# Patient Record
Sex: FEMALE | Race: WHITE | NOT HISPANIC OR LATINO | Employment: FULL TIME | URBAN - NONMETROPOLITAN AREA
[De-identification: names, ages, dates, MRNs, and addresses within clinical notes are randomized per-mention and may not be internally consistent; named-entity substitution may affect disease eponyms.]

---

## 2020-06-23 ENCOUNTER — LAB (OUTPATIENT)
Dept: LAB | Facility: HOSPITAL | Age: 60
End: 2020-06-23

## 2020-06-23 ENCOUNTER — TRANSCRIBE ORDERS (OUTPATIENT)
Dept: LAB | Facility: HOSPITAL | Age: 60
End: 2020-06-23

## 2020-06-23 DIAGNOSIS — Z20.828 EXPOSURE TO SARS-ASSOCIATED CORONAVIRUS: Primary | ICD-10-CM

## 2020-06-23 DIAGNOSIS — Z20.828 EXPOSURE TO SARS-ASSOCIATED CORONAVIRUS: ICD-10-CM

## 2020-06-23 PROCEDURE — U0003 INFECTIOUS AGENT DETECTION BY NUCLEIC ACID (DNA OR RNA); SEVERE ACUTE RESPIRATORY SYNDROME CORONAVIRUS 2 (SARS-COV-2) (CORONAVIRUS DISEASE [COVID-19]), AMPLIFIED PROBE TECHNIQUE, MAKING USE OF HIGH THROUGHPUT TECHNOLOGIES AS DESCRIBED BY CMS-2020-01-R: HCPCS

## 2020-06-25 LAB — SARS-COV-2 RNA RESP QL NAA+PROBE: NOT DETECTED

## 2020-09-17 ENCOUNTER — APPOINTMENT (OUTPATIENT)
Dept: MAMMOGRAPHY | Facility: HOSPITAL | Age: 60
End: 2020-09-17

## 2021-01-26 ENCOUNTER — HOSPITAL ENCOUNTER (OUTPATIENT)
Dept: GENERAL RADIOLOGY | Facility: HOSPITAL | Age: 61
Discharge: HOME | End: 2021-01-26
Admitting: NURSE PRACTITIONER

## 2021-01-26 ENCOUNTER — TRANSCRIBE ORDERS (OUTPATIENT)
Dept: ADMINISTRATIVE | Facility: HOSPITAL | Age: 61
End: 2021-01-26

## 2021-01-26 DIAGNOSIS — R10.9 ABDOMINAL PAIN, UNSPECIFIED ABDOMINAL LOCATION: Primary | ICD-10-CM

## 2021-01-26 DIAGNOSIS — R10.9 ABDOMINAL PAIN, UNSPECIFIED ABDOMINAL LOCATION: ICD-10-CM

## 2021-01-26 PROCEDURE — 74018 RADEX ABDOMEN 1 VIEW: CPT

## 2021-01-26 PROCEDURE — 74018 RADEX ABDOMEN 1 VIEW: CPT | Performed by: RADIOLOGY

## 2021-03-26 ENCOUNTER — TRANSCRIBE ORDERS (OUTPATIENT)
Dept: ADMINISTRATIVE | Facility: HOSPITAL | Age: 61
End: 2021-03-26

## 2021-03-26 DIAGNOSIS — R10.9 ABDOMINAL PAIN, UNSPECIFIED ABDOMINAL LOCATION: Primary | ICD-10-CM

## 2021-08-10 ENCOUNTER — TRANSCRIBE ORDERS (OUTPATIENT)
Dept: PHYSICAL THERAPY | Facility: CLINIC | Age: 61
End: 2021-08-10

## 2021-08-10 DIAGNOSIS — S39.012A LUMBAR STRAIN, INITIAL ENCOUNTER: Primary | ICD-10-CM

## 2021-08-11 ENCOUNTER — TREATMENT (OUTPATIENT)
Dept: PHYSICAL THERAPY | Facility: CLINIC | Age: 61
End: 2021-08-11

## 2021-08-11 DIAGNOSIS — S39.012D STRAIN OF LUMBAR REGION, SUBSEQUENT ENCOUNTER: Primary | ICD-10-CM

## 2021-08-11 PROCEDURE — 97162 PT EVAL MOD COMPLEX 30 MIN: CPT | Performed by: PHYSICAL THERAPIST

## 2021-08-11 NOTE — PROGRESS NOTES
"  Physical Therapy Initial Evaluation and Plan of Care    Patient: Elizabeth Olivares   : 1960  Diagnosis/ICD-10 Code:  Strain of lumbar region, subsequent encounter [S39.012D]  Referring practitioner: JAYNE Pacheco  Date of Initial Visit: 2021  Today's Date: 2021  Patient seen for 1 sessions    Visit Diagnoses:    ICD-10-CM ICD-9-CM   1. Strain of lumbar region, subsequent encounter  S39.012D V58.89     847.2           Subjective Evaluation    History of Present Illness  Date of onset: 8/3/2021  Mechanism of injury: The patient reports injuring her low back while assisting a patient. She states the patient slipped from the couch to the floor. When the patient tried to assist the elderly patient to the couch, using a gait belt, she experienced a sharp pull in her low back. The patient states she was unable to transfer her patient to the couch and called the patient's relative for assistance. She states once the adrenaline wore off she began experiencing increased pain. The patient reports the pain is \"like a pulsating throb\". The patient reports she was seen at [facility] on 21. She states she was instructed to not perform repetitive twist/turn or lifting greater than 10 pounds. The patient is currently working on light duty, cooking for patients. She followed up with [facility] on 8/10/21 and instructed to begin physical therapy. She states she has not experienced shooting pain down her leg. The patient reports being independent prior to today's session.       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   Precautions and Work Restrictions: No repetitive twisting, turning, or lifting greater than 10#.Quality of life: good    Pain  Current pain rating: 3  At best pain rating: 3  At worst pain ratin  Quality: throbbing and tight  Relieving factors: medications and rest  Aggravating factors: lifting, movement, ambulation, prolonged " positioning, sleeping, squatting and stairs    Social Support  Lives in: one-story house (Three exterior steps without handrails)  Lives with: alone    Hand dominance: right    Diagnostic Tests  No diagnostic tests performed    Treatments  Previous treatment: medication  Patient Goals  Patient goals for therapy: decreased pain, improved balance, increased motion, increased strength, independence with ADLs/IADLs and return to sport/leisure activities  Patient goal: Return to full duty at work           Objective          Palpation   Left   No palpable tenderness to the external abdominal oblique, iliacus, iliopsoas, internal abdominal oblique, quadratus lumborum, rectus abdominus and transverse abdominus.   Muscle spasm in the erector spinae.   Tenderness of the erector spinae, gluteus medius, lumbar interspinals and lumbar paraspinals.     Right   No palpable tenderness to the external abdominal oblique, iliacus, iliopsoas, internal abdominal oblique, quadratus lumborum, rectus abdominus and transverse abdominus.   Muscle spasm in the erector spinae and lumbar interspinals. Tenderness of the erector spinae, gluteus medius, lumbar interspinals and lumbar paraspinals.     Tenderness     Lumbar Spine  Tenderness in the spinous process, left transverse process and right transverse process.     Additional Tenderness Details  Right transverse processes T12-L2  Right SIJ pain    Neurological Testing     Reflexes   Left   Patellar (L4): normal (2+)  Achilles (S1): normal (2+)    Right   Patellar (L4): normal (2+)  Achilles (S1): normal (2+)    Additional Neurological Details  Diminished L3 sensation on the right.    Active Range of Motion     Lumbar   Flexion: Active lumbar flexion: 50%   Extension: Active lumbar extension: 25% impaired.   Left lateral flexion: Active left lumbar lateral flexion: 25% impaired.   Right lateral flexion: Active right lumbar lateral flexion: 25% impaired.   Left rotation: Active left lumbar  rotation: 50%   Right rotation: Active right lumbar rotation: 50%     Strength/Myotome Testing     Left Hip   Planes of Motion   Flexion: 4-  Abduction: 4  Adduction: 4    Right Hip   Planes of Motion   Flexion: 4-  Abduction: 4  Adduction: 4    Left Knee   Flexion: 4-  Extension: 4-    Right Knee   Flexion: 4-  Extension: 4-    Tests       Thoracic   Negative slump.     Lumbar     Left   Negative passive SLR.     Right   Negative passive SLR.     Left Pelvic Girdle/Sacrum   Positive: sacrum compression, sacral spring and thigh thrust.   Negative: gapping.     Right Pelvic Girdle/Sacrum   Positive: sacrum compression, sacral spring and thigh thrust.   Negative: gapping.     Left Hip   Positive AMBERLY, FADIR, scour and SI compression.   Negative SI distraction.     Right Hip   Positive AMBERLY, FADIR, scour and SI compression.   Negative SI distraction.     Additional Tests Details  Right anteriorly rotated innominate addressed with METs.          Assessment & Plan     Assessment  Impairments: abnormal gait, abnormal or restricted ROM, activity intolerance, impaired physical strength, lacks appropriate home exercise program, pain with function and safety issue  Assessment details: The patient is a 60 year old female presenting to the clinic with lumbar pain following an injury at work. The patient demonstrated impaired lumbar AROM in all planes, with pain present. In addition, bilateral lower extremity weakness was present. The patient reported the absence of bilateral lower extremity radicular pain which was not produced with special tests. Upon assessment, the patient demonstrated an anteriorly rotated right innominate, consistent with subjective reports and special tests, which was addressed with METs. Muscle guarding was present in bilateral lumbar paraspinals, as well as tenderness and hypomobility of T10-L2. The patient would benefit from skilled physical therapy to address functional limitations and impairments.  She will be progressed as tolerated for maximal gains.    Prognosis: good  Functional Limitations: lifting, sleeping, walking, pulling, pushing, uncomfortable because of pain, sitting, standing and unable to perform repetitive tasks  Goals  Plan Goals: SHORT TERM GOALS:     2 weeks  1. Pt will be instructed in HEP for improved independence.  2. Pt to demonstrate a 25% lumbar AROM for improved ability to perform ADL's.  3. Pt to demonstrate bilateral hip strength 4/5 in all planes to improved stability of the core/trunk.     LONG TERM GOALS:   6 weeks  1. Pt to demonstrate lumbar AROM within 75% of expected norms to allow for improved ability to perform functional activities.  2. Pt to demonstrate ability to independently perform supine<>sit transfers without extra time required for improved functional mobility.  3. Pt to report less than 30% impairment on the Modified Oswestry for improved functional independence.  4. Pt will demonstrate the ability to perform 10 functional squats with no increase in pain for improved ability to perform work tasks.  5. Pt will report the ability to stand for at least 45 minutes with no increase in lumbar pain for improved ability to perform work tasks.        Plan  Therapy options: will be seen for skilled physical therapy services  Planned modality interventions: cryotherapy, thermotherapy (hydrocollator packs), electrical stimulation/Russian stimulation, traction, ultrasound and dry needling  Planned therapy interventions: manual therapy, home exercise program, flexibility, body mechanics training, strengthening, spinal/joint mobilization, postural training, abdominal trunk stabilization, balance/weight-bearing training, functional ROM exercises, gait training, joint mobilization, motor coordination training, neuromuscular re-education, soft tissue mobilization, stretching, transfer training and therapeutic activities  Duration in visits: 18  Duration in weeks: 6  Treatment plan  discussed with: patient  Plan details: Pt will be re-assessed upon follow up for tolerance to pain relieving exercise program.     Moderate Evaluation  98287  Re-evaluation   91574    Therapeutic exercise  77698  Therapeutic activity    71895  Neuromuscular re-education   07974  Manual therapy   65561  Gait training  37119    Attended e-stim  89119  Unattended e-stim (Private)  42446  Moist heat/cryotherapy 71849   Ultrasound   95686  Iontophoresis   43494  Mechanical traction 51690          Manual Therapy:         mins  62222;  Therapeutic Exercise:         mins  38410;     Neuromuscular Ken:        mins  67493;    Therapeutic Activity:          mins  15479;     Gait Training:           mins  17549;     Ultrasound:          mins  79329;    Electrical Stimulation:         mins  60572 ( );  Dry Needling          mins self-pay    Timed Treatment:  0   mins   Total Treatment:     34   mins    PT SIGNATURE: Ashley Claudene Dalton, PT   DATE TREATMENT INITIATED: 8/11/2021    Initial Certification  Certification Period: 11/9/2021  I certify that the therapy services are furnished while this patient is under my care.  The services outlined above are required by this patient, and will be reviewed every 90 days.     PHYSICIAN: Vonnie Castaneda, APRN      DATE:     Please sign and return via fax to [phone].. Thank you, [facility].

## 2021-08-16 ENCOUNTER — TREATMENT (OUTPATIENT)
Dept: PHYSICAL THERAPY | Facility: CLINIC | Age: 61
End: 2021-08-16

## 2021-08-16 DIAGNOSIS — S39.012D STRAIN OF LUMBAR REGION, SUBSEQUENT ENCOUNTER: Primary | ICD-10-CM

## 2021-08-16 PROCEDURE — 97110 THERAPEUTIC EXERCISES: CPT | Performed by: PHYSICAL THERAPIST

## 2021-08-16 PROCEDURE — 97014 ELECTRIC STIMULATION THERAPY: CPT | Performed by: PHYSICAL THERAPIST

## 2021-08-16 NOTE — PROGRESS NOTES
"   Physical Therapy Daily Treatment Note      Patient: Elziabeth Olivares   : 1960  Referring practitioner: JAYNE Pacheco  Date of Initial Visit: Type: THERAPY  Noted: 2021  Today's Date: 2021  Patient seen for 2 sessions           Subjective Evaluation    History of Present Illness    Subjective comment: Pt reports 2/10 low back pain prior to today's session. She states, \"I think the alignment helped a lot, I had no pain yesterday\".Pain  Current pain ratin           Objective   See Exercise, Manual, and Modality Logs for complete treatment.       Assessment & Plan     Assessment  Assessment details: Today's session was initiated with moist heat combined with IFC to the lumbar region for pain relief and muscle relaxtion.Therapeutic exercises were then performed to address impaired posture, lumbar AROM, and bilateral lower extremity weakness. Rest breaks were provided as needed. Upon conclusion of today's session, the SIJ was assessed, with no abnormal rotation present. The patient reported 2/10 lumbar pain upon conclusion of today's treatment session.    Plan  Plan details: Introduce manual therapy to address muscle guarding.        Visit Diagnoses:    ICD-10-CM ICD-9-CM   1. Strain of lumbar region, subsequent encounter  S39.012D V58.89     847.2       Progress per Plan of Care           Timed:  Manual Therapy:         mins  29389;  Therapeutic Exercise:   31   mins  54217;     Neuromuscular Ken:        mins  83175;    Therapeutic Activity:          mins  91806;     Gait Training:           mins  13633;     Ultrasound:          mins  97935;    Electrical Stimulation:         mins  12554 ( );    Untimed:  Electrical Stimulation:   10     mins  67381 ( );  Mechanical Traction:         mins  49959;     Timed Treatment:   31   mins   Total Treatment:     41   mins    Ashley Claudene Dalton, CHUCK  Physical Therapist                  "

## 2021-08-18 ENCOUNTER — TREATMENT (OUTPATIENT)
Dept: PHYSICAL THERAPY | Facility: CLINIC | Age: 61
End: 2021-08-18

## 2021-08-18 DIAGNOSIS — S39.012D STRAIN OF LUMBAR REGION, SUBSEQUENT ENCOUNTER: Primary | ICD-10-CM

## 2021-08-18 PROCEDURE — 97014 ELECTRIC STIMULATION THERAPY: CPT | Performed by: PHYSICAL THERAPIST

## 2021-08-18 PROCEDURE — 97110 THERAPEUTIC EXERCISES: CPT | Performed by: PHYSICAL THERAPIST

## 2021-08-18 NOTE — PROGRESS NOTES
"   Physical Therapy Daily Treatment Note      Patient: Elizabeth Olivares   : 1960  Referring practitioner: JAYNE Pacheco  Date of Initial Visit: Type: THERAPY  Noted: 2021  Today's Date: 2021  Patient seen for 3 sessions           Subjective Evaluation    History of Present Illness    Subjective comment: The patient reports 1/10 low back pain prior to today's session. She states she feels as though she's \"getting better\".Pain  Current pain ratin           Objective   See Exercise, Manual, and Modality Logs for complete treatment.       Assessment & Plan     Assessment  Assessment details: Today's treatment was initiated with moist heat combined with IFC to the lumbar region for pain relief and muscle relaxation, with no skin irritation observed. Therapeutic exercises were progressed to include standing activities for improved bilateral hip strength and standing balance. In addition, thera-band activities were improved for BLE strength. The patient reported fatigue but no increase in pain with therapeutic exercises. Following today's treatment she reported she was \"stiff, but 0/10 pain\".     Plan  Plan details: Progress standing activities for improved functional independence.        Visit Diagnoses:    ICD-10-CM ICD-9-CM   1. Strain of lumbar region, subsequent encounter  S39.012D V58.89     847.2       Progress per Plan of Care           Timed:  Manual Therapy:         mins  20689;  Therapeutic Exercise:   42   mins  78228;     Neuromuscular Ken:        mins  67127;    Therapeutic Activity:          mins  64081;     Gait Training:           mins  72225;     Ultrasound:          mins  64912;    Electrical Stimulation:         mins  18166 ( );    Untimed:  Electrical Stimulation:    10     mins  88060 ( );  Mechanical Traction:         mins  03058;     Timed Treatment:   42   mins   Total Treatment:     52   mins    Ashley Claudene Dalton, PT  Physical " Therapist

## 2021-08-20 ENCOUNTER — TREATMENT (OUTPATIENT)
Dept: PHYSICAL THERAPY | Facility: CLINIC | Age: 61
End: 2021-08-20

## 2021-08-20 DIAGNOSIS — S39.012D STRAIN OF LUMBAR REGION, SUBSEQUENT ENCOUNTER: Primary | ICD-10-CM

## 2021-08-20 PROCEDURE — 97110 THERAPEUTIC EXERCISES: CPT | Performed by: PHYSICAL THERAPIST

## 2021-08-20 PROCEDURE — 97014 ELECTRIC STIMULATION THERAPY: CPT | Performed by: PHYSICAL THERAPIST

## 2021-08-20 NOTE — PROGRESS NOTES
"   Physical Therapy Daily Treatment Note      Patient: Elizabeth Olivares   : 1960  Referring practitioner: JAYNE Pacheco  Date of Initial Visit: Type: THERAPY  Noted: 2021  Today's Date: 2021  Patient seen for 4 sessions           Subjective Evaluation    History of Present Illness    Subjective comment: Pt reports 1/10 low back \"stiffness\" prior to today's session. She states she needs \"to be able to lift\".Pain  Current pain ratin           Objective   See Exercise, Manual, and Modality Logs for complete treatment.       Assessment & Plan     Assessment  Assessment details: Today's session included increased repetitions of therapeutic exercises for improved strength and endurance. An increased amount of rest breaks were provided secondary to fatigue with progression. SKTC and piriformis stretches were also introduced into today's session, addressing bilateral hip and lumbar tightness. The patient was instructed to perform these two stretching exercises at home as well. Today's treatment concluded with MH combined with IFC to the lumbar region for pain relief and muscle relaxation. No skin irritation was observed following modalities. The patient reported 0/10 pain and stiffness following today's treatment.    Plan  Plan details: Progress standing activities as tolerated, introducing lifting as able, for improved ability to perform work tasks.        Visit Diagnoses:    ICD-10-CM ICD-9-CM   1. Strain of lumbar region, subsequent encounter  S39.012D V58.89     847.2       Progress per Plan of Care           Timed:  Manual Therapy:         mins  31240;  Therapeutic Exercise:    44     mins  64640;     Neuromuscular Ken:        mins  58469;    Therapeutic Activity:          mins  71681;     Gait Training:           mins  74799;     Ultrasound:          mins  19508;    Electrical Stimulation:         mins  37890 ( );    Untimed:  Electrical Stimulation:    10     mins  11864 ( " );  Mechanical Traction:         mins  72449;     Timed Treatment:   44   mins   Total Treatment:     54   mins    Ashley Claudene Dalton, PT  Physical Therapist

## 2021-09-13 ENCOUNTER — TREATMENT (OUTPATIENT)
Dept: PHYSICAL THERAPY | Facility: CLINIC | Age: 61
End: 2021-09-13

## 2021-09-13 DIAGNOSIS — S39.012D STRAIN OF LUMBAR REGION, SUBSEQUENT ENCOUNTER: Primary | ICD-10-CM

## 2021-09-13 PROCEDURE — 97110 THERAPEUTIC EXERCISES: CPT | Performed by: PHYSICAL THERAPIST

## 2021-09-13 PROCEDURE — 97014 ELECTRIC STIMULATION THERAPY: CPT | Performed by: PHYSICAL THERAPIST

## 2021-09-13 NOTE — PROGRESS NOTES
Physical Therapy Daily Treatment Note      Patient: Elizabeth Olivares   : 1960  Referring practitioner: JAYNE Pacheco  Date of Initial Visit: Type: THERAPY  Noted: 2021  Today's Date: 2021  Patient seen for 5 sessions           Subjective Evaluation    History of Present Illness    Subjective comment: Pt reports 0/10 low back prior to today's session. She states she has been unable to attend secondary to sickness, but has been performing her HEP as able.Pain  Current pain ratin           Objective   See Exercise, Manual, and Modality Logs for complete treatment.       Assessment & Plan     Assessment  Assessment details: Moist heat combined with IFC was applied to the patient's lumbar region to begin today's session, addressing pain and muscle tightness, with no skin irritation observed. Today's session included therapeutic exercises in the seated, supine, and standing positions to address lumbar ROM and BLE strength. A minimal amount of supine activities were performed secondary to patient having residual effects of recent Covid-19. Rest breaks were provided as needed and oxygen saturation was monitored throughout the session due to recently having Covid-19. The patient's oxygen saturation remained 98%-99% throughout the session. The patient reported 1/10 lumbar pain following today's treatment.      Plan  Plan details: Progress repetitions as able due to ongoing breathing issues from Covid-19.        Visit Diagnoses:    ICD-10-CM ICD-9-CM   1. Strain of lumbar region, subsequent encounter  S39.012D V58.89     847.2       Progress per Plan of Care           Timed:  Manual Therapy:         mins  71503;  Therapeutic Exercise:  26   mins  22971;     Neuromuscular Ken:        mins  22256;    Therapeutic Activity:          mins  89656;     Gait Training:           mins  83610;     Ultrasound:          mins  39246;    Electrical Stimulation:         mins  25272 (  );    Untimed:  Electrical Stimulation:   15    mins  33876 ( );  Mechanical Traction:         mins  04866;     Timed Treatment:   26  mins   Total Treatment:    41  mins    Ashley Claudene Dalton, PT  Physical Therapist

## 2021-09-15 ENCOUNTER — TREATMENT (OUTPATIENT)
Dept: PHYSICAL THERAPY | Facility: CLINIC | Age: 61
End: 2021-09-15

## 2021-09-15 DIAGNOSIS — S39.012D STRAIN OF LUMBAR REGION, SUBSEQUENT ENCOUNTER: Primary | ICD-10-CM

## 2021-09-15 PROCEDURE — 97110 THERAPEUTIC EXERCISES: CPT | Performed by: PHYSICAL THERAPIST

## 2021-09-15 PROCEDURE — 97014 ELECTRIC STIMULATION THERAPY: CPT | Performed by: PHYSICAL THERAPIST

## 2021-09-15 NOTE — PROGRESS NOTES
"Progress Note      Patient: Elizabeth Olivares   : 1960  Diagnosis/ICD-10 Code:  Strain of lumbar region, subsequent encounter [S39.012D]  Referring practitioner: JAYNE Pacheco  Date of Initial Visit: Type: THERAPY  Noted: 2021  Today's Date: 9/15/2021  Patient seen for 6 sessions      Subjective:     Subjective Questionnaire: Oswestry: 44% impaired  Clinical Progress: improved  Home Program Compliance: Yes  Treatment has included: therapeutic exercise, manual therapy, therapeutic activity, electrical stimulation, moist heat and cryotherapy    Subjective Evaluation    History of Present Illness    Subjective comment: Pt reports 0/10 low back pain prior to today's session. She reports she \"wants to keep getting stronger\". The patient reports she is scheduled to follow-up with JAYNE Albert on 21.Pain  Current pain ratin         Objective          Active Range of Motion     Lumbar   Extension: Active lumbar extension: 75% available.   Left lateral flexion: Active left lumbar lateral flexion: 50%   Right lateral flexion: Active right lumbar lateral flexion: 50%   Left rotation: Active left lumbar rotation: 75% available.   Right rotation: Active right lumbar rotation: 75% available.     Strength/Myotome Testing     Left Hip   Planes of Motion   Flexion: 4  Abduction: 4    Right Hip   Planes of Motion   Flexion: 4  Abduction: 4    Left Knee   Flexion: 4  Extension: 4    Right Knee   Flexion: 4  Extension: 4      Assessment & Plan     Assessment  Impairments: abnormal gait, abnormal or restricted ROM, activity intolerance, impaired physical strength, lacks appropriate home exercise program, pain with function and safety issue  Assessment details: Outcome measures were performed during today's session with patient achieving 3/3 STG and 1/5 LTG. The patient demonstrated improvements in lumbar AROM and bilateral lower extremity strength. She reported 44% impairment on the Modified " Oswestry, reporting difficulty with prolonged positioning. The patient would benefit from continued skilled physical therapy to further improve functional independence.  Moist heat combined with IFC was applied to the lumbar region for pain relief and muscle relaxation to initiate today's session. No skin irritation was observed following modalities. Today's session included therapeutic exercises to address lumbar ROM and bilateral lower extremity strength. Rest breaks were provided as needed. Supine activities were held secondary to patient having difficulty lying down due to recent Covid-19. The patient reported 0/10 lumbar pain upon conclusion of today's treatment.  Prognosis: good  Functional Limitations: lifting, sleeping, walking, pulling, pushing, uncomfortable because of pain, sitting, standing and unable to perform repetitive tasks  Goals  Plan Goals: SHORT TERM GOALS:     2 weeks  1. Pt will be instructed in HEP for improved independence.        Met  2. Pt to demonstrate a 25% lumbar AROM for improved ability to perform ADL's.        Met  3. Pt to demonstrate bilateral hip strength 4/5 in all planes to improved stability of the core/trunk.         Met: 4/5    LONG TERM GOALS:   6 weeks  1. Pt to demonstrate lumbar AROM within 75% of expected norms to allow for improved ability to perform functional activities.       Partially Met: 50% left lateral flexion  2. Pt to demonstrate ability to independently perform supine<>sit transfers without extra time required for improved functional mobility.       Met  3. Pt to report less than 30% impairment on the Modified Oswestry for improved functional independence.       Progressin% impaired  4. Pt will demonstrate the ability to perform 10 functional squats with no increase in pain for improved ability to perform work tasks.       Ongoing  5. Pt will report the ability to stand for at least 45 minutes with no increase in lumbar pain for improved ability to  perform work tasks.       Ongoing      Plan  Therapy options: will be seen for skilled physical therapy services  Planned modality interventions: cryotherapy, thermotherapy (hydrocollator packs), electrical stimulation/Russian stimulation, traction, ultrasound and dry needling  Planned therapy interventions: manual therapy, home exercise program, flexibility, body mechanics training, strengthening, spinal/joint mobilization, postural training, abdominal trunk stabilization, balance/weight-bearing training, functional ROM exercises, gait training, joint mobilization, motor coordination training, neuromuscular re-education, soft tissue mobilization, stretching, transfer training and therapeutic activities  Duration in visits: 12  Duration in weeks: 4  Treatment plan discussed with: patient  Plan details: Pt will be re-assessed upon follow up for tolerance to pain relieving exercise program.     Moderate Evaluation  14052  Re-evaluation   49957    Therapeutic exercise  80890  Therapeutic activity    33300  Neuromuscular re-education   65128  Manual therapy   33768  Gait training  85864    Attended e-stim  33594  Unattended e-stim (Private)  79703  Moist heat/cryotherapy 72359   Ultrasound   86347  Iontophoresis   03105  Mechanical traction 01616          Visit Diagnoses:    ICD-10-CM ICD-9-CM   1. Strain of lumbar region, subsequent encounter  S39.012D V58.89     847.2       Progress toward previous goals: Partially Met    New Goals  Short-term goals (STG): 3/3  Long-term goals (LTG): 1/5      Recommendations: Continue as planned  Timeframe: 1 month  Prognosis to achieve goals: good    PT Signature: Ashley Claudene Dalton, PT      Based upon review of the patient's progress and continued therapy plan, it is my medical opinion that Elizabeth Olivares should continue physical therapy treatment at [facility]  [address]  [address]  [place]  [address]  [phone].    Signature: __________________________________  Vonnie Castaneda APRN    Timed:  Manual Therapy:         mins  60180;  Therapeutic Exercise:   29   mins  53115;     Neuromuscular Ken:        mins  07290;    Therapeutic Activity:          mins  93555;     Gait Training:           mins  61712;     Ultrasound:          mins  60988;    Electrical Stimulation:         mins  11712 ( );    Untimed:  Electrical Stimulation:   15    mins  12523 ( );  Mechanical Traction:         mins  24579;     Timed Treatment:   29   mins   Total Treatment:     44   mins

## 2021-09-17 ENCOUNTER — TREATMENT (OUTPATIENT)
Dept: PHYSICAL THERAPY | Facility: CLINIC | Age: 61
End: 2021-09-17

## 2021-09-17 DIAGNOSIS — S39.012D STRAIN OF LUMBAR REGION, SUBSEQUENT ENCOUNTER: Primary | ICD-10-CM

## 2021-09-17 PROCEDURE — 97014 ELECTRIC STIMULATION THERAPY: CPT | Performed by: PHYSICAL THERAPIST

## 2021-09-17 PROCEDURE — 97110 THERAPEUTIC EXERCISES: CPT | Performed by: PHYSICAL THERAPIST

## 2021-09-17 NOTE — PROGRESS NOTES
Physical Therapy Daily Treatment Note      Patient: Elizabeth Olivares   : 1960  Referring practitioner: JAYNE Pacheco  Date of Initial Visit: Type: THERAPY  Noted: 2021  Today's Date: 2021  Patient seen for 7 sessions         Subjective:  Patient reports doing well this morning, w/ no complaints of pain prior to tx.  Patient states she is scheduled to f/u with Psychiatric Occup[facility] Medicine on Tuesday, and is hoping to be released back to work.      Objective   See Exercise, Manual, and Modality Logs for complete treatment.       Assessment/Plan:  Patient responded well to today's session w/ no complaints of pain noted following.  Pt provided w/ rest breaks as needed secondary to dyspnea which pt relates to recent bout of COVID.  O2 levels monitored w/ reading of 99%, HR 68.  Treatment initiated w/ therex as listed w/ focus on improved lumbar ROM, improved lumbar stability and postural strength, as tolerated. Exercise progressed w/ mid and low rows w/ tband initiated.  Pt observed w/ good tolerance, and was provided w/ cues and demonstration for good form, and for max benefit w/ activities.   w/ Estim applied at conclusion.  Pt continues to benefit from therapy services and will be progressed as tolerated to address goals, and restore PLOF.  Continue w/ PT's POC.     Visit Diagnoses:    ICD-10-CM ICD-9-CM   1. Strain of lumbar region, subsequent encounter  S39.012D V58.89     847.2       Progress per Plan of Care and Progress strengthening /stabilization /functional activity           Timed:  Manual Therapy:         mins  91079;  Therapeutic Exercise:     42    mins  99878;     Neuromuscular Ken:        mins  55778;    Therapeutic Activity:          mins  58739;     Gait Training:           mins  23582;     Ultrasound:          mins  26841;    Electrical Stimulation:         mins  31669 ( );    Untimed:  Electrical Stimulation:    15     mins  82259 (  );  Mechanical Traction:         mins  25892;     Timed Treatment:  42    mins   Total Treatment:   57     mins  Flakita Mcgee. HORACE Louis  Physical Therapist

## 2021-09-21 ENCOUNTER — DOCUMENTATION (OUTPATIENT)
Dept: PHYSICAL THERAPY | Facility: CLINIC | Age: 61
End: 2021-09-21

## 2021-09-21 DIAGNOSIS — S39.012D STRAIN OF LUMBAR REGION, SUBSEQUENT ENCOUNTER: Primary | ICD-10-CM

## 2021-09-21 NOTE — PROGRESS NOTES
Discharge Summary  Discharge Summary from Physical Therapy Report    Patient Information  Elizabeth Olivares  1960    Date of PT Evaluation: 8/11/21  Number of Visits: Evaluation plus 6 follow-up visits     Discharge Status of Patient: Discharged by JAYNE Albert on 9/21/21, instructed to discontinue physical therapy.    Goals: Unable to assess secondary to patient being instructed to discharge by referring provider.    Visit Diagnoses:    ICD-10-CM ICD-9-CM   1. Strain of lumbar region, subsequent encounter  S39.012D V58.89     847.2       Discharge Plan: Continue with current home exercise program as instructed    Comments: The patient was evaluated for skilled physical therapy for lumbar pain on 8/11/21. She attended six follow-up sessions with good effort. The patient progressed toward established goals, with improved mobility and function reported. The patient was seen by JAYNE Albert on 9/21/21 and instructed to discontinue physical therapy secondary to patient's progress. Therefore, the patient is being discharged at this time.    Date of Discharge: 9/21/21        Ashley Claudene Dalton, PT  Physical Therapist

## 2024-09-17 ENCOUNTER — HOSPITAL ENCOUNTER (EMERGENCY)
Facility: HOSPITAL | Age: 64
Discharge: HOME | End: 2024-09-17
Attending: STUDENT IN AN ORGANIZED HEALTH CARE EDUCATION/TRAINING PROGRAM | Admitting: STUDENT IN AN ORGANIZED HEALTH CARE EDUCATION/TRAINING PROGRAM
Payer: MEDICAID

## 2024-09-17 VITALS
BODY MASS INDEX: 34.89 KG/M2 | SYSTOLIC BLOOD PRESSURE: 163 MMHG | RESPIRATION RATE: 20 BRPM | TEMPERATURE: 98.4 F | DIASTOLIC BLOOD PRESSURE: 75 MMHG | HEIGHT: 64 IN | OXYGEN SATURATION: 100 % | WEIGHT: 204.4 LBS | HEART RATE: 72 BPM

## 2024-09-17 DIAGNOSIS — K04.7 DENTAL ABSCESS: Primary | ICD-10-CM

## 2024-09-17 PROCEDURE — 96372 THER/PROPH/DIAG INJ SC/IM: CPT

## 2024-09-17 PROCEDURE — 99283 EMERGENCY DEPT VISIT LOW MDM: CPT

## 2024-09-17 PROCEDURE — 25010000002 KETOROLAC TROMETHAMINE PER 15 MG: Performed by: STUDENT IN AN ORGANIZED HEALTH CARE EDUCATION/TRAINING PROGRAM

## 2024-09-17 PROCEDURE — 25010000002 DEXAMETHASONE SODIUM PHOSPHATE 10 MG/ML SOLUTION: Performed by: STUDENT IN AN ORGANIZED HEALTH CARE EDUCATION/TRAINING PROGRAM

## 2024-09-17 RX ORDER — DEXAMETHASONE SODIUM PHOSPHATE 10 MG/ML
5 INJECTION, SOLUTION INTRAMUSCULAR; INTRAVENOUS ONCE
Status: COMPLETED | OUTPATIENT
Start: 2024-09-17 | End: 2024-09-17

## 2024-09-17 RX ORDER — KETOROLAC TROMETHAMINE 30 MG/ML
30 INJECTION, SOLUTION INTRAMUSCULAR; INTRAVENOUS ONCE
Status: COMPLETED | OUTPATIENT
Start: 2024-09-17 | End: 2024-09-17

## 2024-09-17 RX ADMIN — AMOXICILLIN AND CLAVULANATE POTASSIUM 1 TABLET: 875; 125 TABLET, FILM COATED ORAL at 19:06

## 2024-09-17 RX ADMIN — DEXAMETHASONE SODIUM PHOSPHATE 5 MG: 10 INJECTION INTRAMUSCULAR; INTRAVENOUS at 19:06

## 2024-09-17 RX ADMIN — BENZOCAINE: 200 LIQUID DENTAL; ORAL; PERIODONTAL at 19:06

## 2024-09-17 RX ADMIN — KETOROLAC TROMETHAMINE 30 MG: 60 INJECTION, SOLUTION INTRAMUSCULAR at 19:06
